# Patient Record
Sex: MALE | Race: WHITE | URBAN - NONMETROPOLITAN AREA
[De-identification: names, ages, dates, MRNs, and addresses within clinical notes are randomized per-mention and may not be internally consistent; named-entity substitution may affect disease eponyms.]

---

## 2018-01-16 PROBLEM — Q14.2 COLOBOMA OF OPTIC DISC: Status: ACTIVE | Noted: 2018-01-16

## 2018-01-16 PROBLEM — Z86.79 PERSONAL HISTORY OF OTHER DISEASES OF CIRCULATORY SYSTEM: Status: ACTIVE | Noted: 2018-01-16

## 2018-01-16 PROBLEM — I10 ESSENTIAL HYPERTENSION: Status: ACTIVE | Noted: 2018-01-16

## 2018-01-16 PROBLEM — E78.5 HYPERLIPIDEMIA: Status: ACTIVE | Noted: 2018-01-16

## 2022-12-30 ENCOUNTER — TELEPHONE (OUTPATIENT)
Dept: FAMILY MEDICINE | Facility: OTHER | Age: 37
End: 2022-12-30

## 2022-12-30 DIAGNOSIS — H10.9 BACTERIAL CONJUNCTIVITIS: Primary | ICD-10-CM

## 2022-12-30 RX ORDER — CIPROFLOXACIN HYDROCHLORIDE 3.5 MG/ML
1-2 SOLUTION/ DROPS TOPICAL 4 TIMES DAILY
Qty: 2.8 ML | Refills: 0 | Status: SHIPPED | OUTPATIENT
Start: 2022-12-30 | End: 2023-01-06

## 2022-12-30 NOTE — TELEPHONE ENCOUNTER
Patient has not been seen here since 2016.  Pink eye has spread thru the house.  He is requesting medication as children were seen and treated by Nikky White MD.    Piper Mcdonald LPN............12/30/2022 9:30 AM

## 2022-12-30 NOTE — TELEPHONE ENCOUNTER
Mom Annita Broussard (I sent a note) was in and kids had pink eye now her and dad have it also- he is allergic to Erythromycin and they would like drops-Hillary

## 2025-08-19 ENCOUNTER — OFFICE VISIT (OUTPATIENT)
Dept: FAMILY MEDICINE | Facility: OTHER | Age: 40
End: 2025-08-19
Payer: COMMERCIAL

## 2025-08-19 VITALS
OXYGEN SATURATION: 97 % | SYSTOLIC BLOOD PRESSURE: 146 MMHG | RESPIRATION RATE: 18 BRPM | DIASTOLIC BLOOD PRESSURE: 92 MMHG | TEMPERATURE: 98.4 F | HEART RATE: 74 BPM | WEIGHT: 210.4 LBS

## 2025-08-19 DIAGNOSIS — R51.9 ACUTE NONINTRACTABLE HEADACHE, UNSPECIFIED HEADACHE TYPE: ICD-10-CM

## 2025-08-19 DIAGNOSIS — R68.83 CHILLS: Primary | ICD-10-CM

## 2025-08-19 DIAGNOSIS — R53.83 OTHER FATIGUE: ICD-10-CM

## 2025-08-19 LAB
ALBUMIN SERPL BCG-MCNC: 4.7 G/DL (ref 3.5–5.2)
ALP SERPL-CCNC: 70 U/L (ref 40–150)
ALT SERPL W P-5'-P-CCNC: 18 U/L (ref 0–70)
ANION GAP SERPL CALCULATED.3IONS-SCNC: 12 MMOL/L (ref 7–15)
AST SERPL W P-5'-P-CCNC: 25 U/L (ref 0–45)
BASOPHILS # BLD AUTO: <0.03 10E3/UL (ref 0–0.2)
BASOPHILS NFR BLD AUTO: 0.3 %
BILIRUB SERPL-MCNC: 0.6 MG/DL
BUN SERPL-MCNC: 12.6 MG/DL (ref 6–20)
CALCIUM SERPL-MCNC: 10 MG/DL (ref 8.8–10.4)
CHLORIDE SERPL-SCNC: 107 MMOL/L (ref 98–107)
CREAT SERPL-MCNC: 0.98 MG/DL (ref 0.67–1.17)
CRP SERPL-MCNC: 3.79 MG/L
EGFRCR SERPLBLD CKD-EPI 2021: >90 ML/MIN/1.73M2
EOSINOPHIL # BLD AUTO: 0.07 10E3/UL (ref 0–0.7)
EOSINOPHIL NFR BLD AUTO: 1.8 %
ERYTHROCYTE [DISTWIDTH] IN BLOOD BY AUTOMATED COUNT: 12.8 % (ref 10–15)
GLUCOSE SERPL-MCNC: 99 MG/DL (ref 70–99)
HCO3 SERPL-SCNC: 24 MMOL/L (ref 22–29)
HCT VFR BLD AUTO: 41.6 % (ref 40–53)
HGB BLD-MCNC: 14.5 G/DL (ref 13.3–17.7)
IMM GRANULOCYTES # BLD: <0.03 10E3/UL
IMM GRANULOCYTES NFR BLD: 0.3 %
LACTATE SERPL-SCNC: 0.4 MMOL/L (ref 0.7–2)
LYMPHOCYTES # BLD AUTO: 1.27 10E3/UL (ref 0.8–5.3)
LYMPHOCYTES NFR BLD AUTO: 32.5 %
MCH RBC QN AUTO: 28.8 PG (ref 26.5–33)
MCHC RBC AUTO-ENTMCNC: 34.9 G/DL (ref 31.5–36.5)
MCV RBC AUTO: 82.7 FL (ref 78–100)
MONOCYTES # BLD AUTO: 0.25 10E3/UL (ref 0–1.3)
MONOCYTES NFR BLD AUTO: 6.4 %
NEUTROPHILS # BLD AUTO: 2.3 10E3/UL (ref 1.6–8.3)
NEUTROPHILS NFR BLD AUTO: 58.7 %
NRBC # BLD AUTO: <0.03 10E3/UL
NRBC BLD AUTO-RTO: 0 /100
PLATELET # BLD AUTO: 211 10E3/UL (ref 150–450)
POTASSIUM SERPL-SCNC: 3.6 MMOL/L (ref 3.4–5.3)
PROT SERPL-MCNC: 7.8 G/DL (ref 6.4–8.3)
RBC # BLD AUTO: 5.03 10E6/UL (ref 4.4–5.9)
SODIUM SERPL-SCNC: 143 MMOL/L (ref 135–145)
WBC # BLD AUTO: 3.91 10E3/UL (ref 4–11)

## 2025-08-19 PROCEDURE — 82310 ASSAY OF CALCIUM: CPT | Mod: ZL

## 2025-08-19 PROCEDURE — 36415 COLL VENOUS BLD VENIPUNCTURE: CPT | Mod: ZL

## 2025-08-19 PROCEDURE — 83605 ASSAY OF LACTIC ACID: CPT | Mod: ZL

## 2025-08-19 PROCEDURE — 86140 C-REACTIVE PROTEIN: CPT | Mod: ZL

## 2025-08-19 PROCEDURE — 85004 AUTOMATED DIFF WBC COUNT: CPT | Mod: ZL

## 2025-08-19 ASSESSMENT — PAIN SCALES - GENERAL: PAINLEVEL_OUTOF10: NO PAIN (0)

## 2025-08-20 LAB
ATRIAL RATE - MUSE: 63 BPM
DIASTOLIC BLOOD PRESSURE - MUSE: NORMAL MMHG
INTERPRETATION ECG - MUSE: NORMAL
P AXIS - MUSE: 37 DEGREES
PR INTERVAL - MUSE: 164 MS
QRS DURATION - MUSE: 174 MS
QT - MUSE: 460 MS
QTC - MUSE: 470 MS
R AXIS - MUSE: 19 DEGREES
SYSTOLIC BLOOD PRESSURE - MUSE: NORMAL MMHG
T AXIS - MUSE: 15 DEGREES
VENTRICULAR RATE- MUSE: 63 BPM

## 2025-09-04 ENCOUNTER — RESULTS FOLLOW-UP (OUTPATIENT)
Dept: FAMILY MEDICINE | Facility: OTHER | Age: 40
End: 2025-09-04

## 2025-09-04 ENCOUNTER — OFFICE VISIT (OUTPATIENT)
Dept: FAMILY MEDICINE | Facility: OTHER | Age: 40
End: 2025-09-04
Attending: FAMILY MEDICINE
Payer: COMMERCIAL

## 2025-09-04 VITALS
TEMPERATURE: 97.8 F | HEIGHT: 67 IN | DIASTOLIC BLOOD PRESSURE: 84 MMHG | OXYGEN SATURATION: 97 % | BODY MASS INDEX: 32.27 KG/M2 | HEART RATE: 72 BPM | SYSTOLIC BLOOD PRESSURE: 138 MMHG | RESPIRATION RATE: 20 BRPM | WEIGHT: 205.6 LBS

## 2025-09-04 DIAGNOSIS — Q21.3 TETRALOGY OF FALLOT: ICD-10-CM

## 2025-09-04 DIAGNOSIS — E78.5 HYPERLIPIDEMIA, UNSPECIFIED HYPERLIPIDEMIA TYPE: ICD-10-CM

## 2025-09-04 DIAGNOSIS — Q24.9 CONGENITAL HEART DISEASE: ICD-10-CM

## 2025-09-04 DIAGNOSIS — Z00.00 ROUTINE GENERAL MEDICAL EXAMINATION AT A HEALTH CARE FACILITY: ICD-10-CM

## 2025-09-04 DIAGNOSIS — I10 PRIMARY HYPERTENSION: Primary | ICD-10-CM

## 2025-09-04 DIAGNOSIS — K21.9 GASTROESOPHAGEAL REFLUX DISEASE WITHOUT ESOPHAGITIS: ICD-10-CM

## 2025-09-04 DIAGNOSIS — Q21.0 VSD (VENTRICULAR SEPTAL DEFECT): ICD-10-CM

## 2025-09-04 PROBLEM — Z80.0 FAMILY HISTORY OF COLON CANCER: Status: RESOLVED | Noted: 2025-09-04 | Resolved: 2025-09-04

## 2025-09-04 PROBLEM — Z86.79 PERSONAL HISTORY OF OTHER DISEASES OF CIRCULATORY SYSTEM: Status: RESOLVED | Noted: 2018-01-16 | Resolved: 2025-09-04

## 2025-09-04 PROBLEM — Z80.0 FAMILY HISTORY OF COLON CANCER: Status: ACTIVE | Noted: 2025-09-04

## 2025-09-04 LAB
CHOLEST SERPL-MCNC: 255 MG/DL
FASTING STATUS PATIENT QL REPORTED: YES
HDLC SERPL-MCNC: 32 MG/DL
LDLC SERPL CALC-MCNC: 191 MG/DL
NONHDLC SERPL-MCNC: 223 MG/DL
TRIGL SERPL-MCNC: 160 MG/DL

## 2025-09-04 PROCEDURE — 83718 ASSAY OF LIPOPROTEIN: CPT | Mod: ZL | Performed by: FAMILY MEDICINE

## 2025-09-04 PROCEDURE — 36415 COLL VENOUS BLD VENIPUNCTURE: CPT | Mod: ZL | Performed by: FAMILY MEDICINE

## 2025-09-04 RX ORDER — FAMOTIDINE 40 MG/1
40 TABLET, FILM COATED ORAL DAILY
Qty: 90 TABLET | Refills: 4 | Status: SHIPPED | OUTPATIENT
Start: 2025-09-04

## 2025-09-04 RX ORDER — ATORVASTATIN CALCIUM 20 MG/1
20 TABLET, FILM COATED ORAL DAILY
Qty: 90 TABLET | Refills: 4 | Status: SHIPPED | OUTPATIENT
Start: 2025-09-04

## 2025-09-04 ASSESSMENT — PAIN SCALES - GENERAL: PAINLEVEL_OUTOF10: NO PAIN (0)
